# Patient Record
Sex: MALE | Race: WHITE | NOT HISPANIC OR LATINO | Employment: UNEMPLOYED | RURAL
[De-identification: names, ages, dates, MRNs, and addresses within clinical notes are randomized per-mention and may not be internally consistent; named-entity substitution may affect disease eponyms.]

---

## 2024-08-27 ENCOUNTER — HOSPITAL ENCOUNTER (OUTPATIENT)
Facility: HOSPITAL | Age: 49
Discharge: HOME | End: 2024-08-28
Attending: HOSPITALIST | Admitting: HOSPITALIST
Payer: COMMERCIAL

## 2024-08-27 DIAGNOSIS — N17.9 AKI (ACUTE KIDNEY INJURY): ICD-10-CM

## 2024-08-27 DIAGNOSIS — E86.0 DEHYDRATION: Primary | ICD-10-CM

## 2024-08-27 DIAGNOSIS — R07.9 CHEST PAIN: ICD-10-CM

## 2024-08-27 LAB
ALBUMIN SERPL BCP-MCNC: 4.9 G/DL (ref 3.5–5)
ALBUMIN/GLOB SERPL: 1.1 {RATIO}
ALP SERPL-CCNC: 114 U/L (ref 45–115)
ALT SERPL W P-5'-P-CCNC: 30 U/L (ref 16–61)
ANION GAP SERPL CALCULATED.3IONS-SCNC: 19 MMOL/L (ref 7–16)
ANION GAP SERPL CALCULATED.3IONS-SCNC: 24 MMOL/L (ref 7–16)
ANION GAP SERPL CALCULATED.3IONS-SCNC: 30 MMOL/L (ref 7–16)
AST SERPL W P-5'-P-CCNC: 34 U/L (ref 15–37)
BASOPHILS # BLD AUTO: 0.04 K/UL (ref 0–0.2)
BASOPHILS NFR BLD AUTO: 0.3 % (ref 0–1)
BILIRUB SERPL-MCNC: 1.1 MG/DL (ref ?–1.2)
BUN SERPL-MCNC: 31 MG/DL (ref 7–18)
BUN SERPL-MCNC: 32 MG/DL (ref 7–18)
BUN/CREAT SERPL: 5 (ref 6–20)
BUN/CREAT SERPL: 5 (ref 6–20)
CALCIUM SERPL-MCNC: 10.1 MG/DL (ref 8.5–10.1)
CALCIUM SERPL-MCNC: 8.9 MG/DL (ref 8.5–10.1)
CHLORIDE SERPL-SCNC: 92 MMOL/L (ref 98–107)
CHLORIDE SERPL-SCNC: 93 MMOL/L (ref 98–107)
CHLORIDE SERPL-SCNC: 96 MMOL/L (ref 98–107)
CK SERPL-CCNC: 417 U/L (ref 39–308)
CO2 SERPL-SCNC: 21 MMOL/L (ref 21–32)
CO2 SERPL-SCNC: 25 MMOL/L (ref 21–32)
CO2 SERPL-SCNC: 27 MMOL/L (ref 21–32)
CREAT SERPL-MCNC: 5.86 MG/DL (ref 0.7–1.3)
CREAT SERPL-MCNC: 6.54 MG/DL (ref 0.7–1.3)
DIFFERENTIAL METHOD BLD: ABNORMAL
EGFR (NO RACE VARIABLE) (RUSH/TITUS): 10 ML/MIN/1.73M2
EGFR (NO RACE VARIABLE) (RUSH/TITUS): 11 ML/MIN/1.73M2
EOSINOPHIL # BLD AUTO: 0.08 K/UL (ref 0–0.5)
EOSINOPHIL NFR BLD AUTO: 0.6 % (ref 1–4)
ERYTHROCYTE [DISTWIDTH] IN BLOOD BY AUTOMATED COUNT: 14.5 % (ref 11.5–14.5)
EST. AVERAGE GLUCOSE BLD GHB EST-MCNC: 117 MG/DL
GLOBULIN SER-MCNC: 4.6 G/DL (ref 2–4)
GLUCOSE SERPL-MCNC: 166 MG/DL (ref 74–106)
GLUCOSE SERPL-MCNC: 70 MG/DL (ref 74–106)
HBA1C MFR BLD HPLC: 5.7 % (ref 4.5–6.6)
HCT VFR BLD AUTO: 51 % (ref 40–54)
HGB BLD-MCNC: 17.1 G/DL (ref 13.5–18)
LYMPHOCYTES # BLD AUTO: 3.35 K/UL (ref 1–4.8)
LYMPHOCYTES NFR BLD AUTO: 24.7 % (ref 27–41)
MAGNESIUM SERPL-MCNC: 2 MG/DL (ref 1.7–2.3)
MCH RBC QN AUTO: 30.3 PG (ref 27–31)
MCHC RBC AUTO-ENTMCNC: 33.5 G/DL (ref 32–36)
MCV RBC AUTO: 90.3 FL (ref 80–96)
MONOCYTES # BLD AUTO: 1.37 K/UL (ref 0–0.8)
MONOCYTES NFR BLD AUTO: 10.1 % (ref 2–6)
MPC BLD CALC-MCNC: 10.2 FL (ref 9.4–12.4)
NEUTROPHILS # BLD AUTO: 8.71 K/UL (ref 1.8–7.7)
NEUTROPHILS NFR BLD AUTO: 64.3 % (ref 53–65)
PLATELET # BLD AUTO: 278 K/UL (ref 150–400)
POTASSIUM SERPL-SCNC: 3.7 MMOL/L (ref 3.5–5.1)
PROT SERPL-MCNC: 9.5 G/DL (ref 6.4–8.2)
RBC # BLD AUTO: 5.65 M/UL (ref 4.6–6.2)
SODIUM SERPL-SCNC: 138 MMOL/L (ref 136–145)
SODIUM SERPL-SCNC: 138 MMOL/L (ref 136–145)
SODIUM SERPL-SCNC: 139 MMOL/L (ref 136–145)
TROPONIN I SERPL DL<=0.01 NG/ML-MCNC: 35.9 PG/ML
TROPONIN I SERPL DL<=0.01 NG/ML-MCNC: 43.7 PG/ML
WBC # BLD AUTO: 13.55 K/UL (ref 4.5–11)

## 2024-08-27 PROCEDURE — 80053 COMPREHEN METABOLIC PANEL: CPT | Performed by: NURSE PRACTITIONER

## 2024-08-27 PROCEDURE — 96361 HYDRATE IV INFUSION ADD-ON: CPT

## 2024-08-27 PROCEDURE — 27000987 HC MATTRESS, MATRIX LOW PROFILE

## 2024-08-27 PROCEDURE — 83735 ASSAY OF MAGNESIUM: CPT | Performed by: NURSE PRACTITIONER

## 2024-08-27 PROCEDURE — 80048 BASIC METABOLIC PNL TOTAL CA: CPT | Mod: XB | Performed by: NURSE PRACTITIONER

## 2024-08-27 PROCEDURE — 83036 HEMOGLOBIN GLYCOSYLATED A1C: CPT | Performed by: NURSE PRACTITIONER

## 2024-08-27 PROCEDURE — 94761 N-INVAS EAR/PLS OXIMETRY MLT: CPT

## 2024-08-27 PROCEDURE — 27000958

## 2024-08-27 PROCEDURE — 93010 ELECTROCARDIOGRAM REPORT: CPT | Mod: ,,, | Performed by: INTERNAL MEDICINE

## 2024-08-27 PROCEDURE — 99285 EMERGENCY DEPT VISIT HI MDM: CPT | Mod: ,,, | Performed by: NURSE PRACTITIONER

## 2024-08-27 PROCEDURE — 25000003 PHARM REV CODE 250: Performed by: NURSE PRACTITIONER

## 2024-08-27 PROCEDURE — 81003 URINALYSIS AUTO W/O SCOPE: CPT | Performed by: NURSE PRACTITIONER

## 2024-08-27 PROCEDURE — 93005 ELECTROCARDIOGRAM TRACING: CPT

## 2024-08-27 PROCEDURE — 84484 ASSAY OF TROPONIN QUANT: CPT | Performed by: NURSE PRACTITIONER

## 2024-08-27 PROCEDURE — G0378 HOSPITAL OBSERVATION PER HR: HCPCS

## 2024-08-27 PROCEDURE — 96360 HYDRATION IV INFUSION INIT: CPT

## 2024-08-27 PROCEDURE — 80051 ELECTROLYTE PANEL: CPT | Performed by: NURSE PRACTITIONER

## 2024-08-27 PROCEDURE — 82550 ASSAY OF CK (CPK): CPT | Performed by: NURSE PRACTITIONER

## 2024-08-27 PROCEDURE — 85025 COMPLETE CBC W/AUTO DIFF WBC: CPT | Performed by: NURSE PRACTITIONER

## 2024-08-27 PROCEDURE — 99285 EMERGENCY DEPT VISIT HI MDM: CPT | Mod: 25

## 2024-08-27 RX ORDER — SODIUM CHLORIDE 9 MG/ML
1000 INJECTION, SOLUTION INTRAVENOUS CONTINUOUS
Status: DISCONTINUED | OUTPATIENT
Start: 2024-08-27 | End: 2024-08-28 | Stop reason: HOSPADM

## 2024-08-27 RX ORDER — ACETAMINOPHEN 325 MG/1
650 TABLET ORAL EVERY 8 HOURS PRN
Status: DISCONTINUED | OUTPATIENT
Start: 2024-08-27 | End: 2024-08-28 | Stop reason: HOSPADM

## 2024-08-27 RX ORDER — TALC
6 POWDER (GRAM) TOPICAL NIGHTLY PRN
Status: DISCONTINUED | OUTPATIENT
Start: 2024-08-27 | End: 2024-08-28 | Stop reason: HOSPADM

## 2024-08-27 RX ORDER — SODIUM CHLORIDE 9 MG/ML
1000 INJECTION, SOLUTION INTRAVENOUS
Status: COMPLETED | OUTPATIENT
Start: 2024-08-27 | End: 2024-08-27

## 2024-08-27 RX ORDER — SODIUM CHLORIDE 9 MG/ML
1000 INJECTION, SOLUTION INTRAVENOUS CONTINUOUS
Status: DISCONTINUED | OUTPATIENT
Start: 2024-08-27 | End: 2024-08-27

## 2024-08-27 RX ORDER — SODIUM CHLORIDE 0.9 % (FLUSH) 0.9 %
10 SYRINGE (ML) INJECTION EVERY 12 HOURS
Status: DISCONTINUED | OUTPATIENT
Start: 2024-08-27 | End: 2024-08-28 | Stop reason: HOSPADM

## 2024-08-27 RX ADMIN — SODIUM CHLORIDE 1000 ML: 9 INJECTION, SOLUTION INTRAVENOUS at 06:08

## 2024-08-27 RX ADMIN — SODIUM CHLORIDE 1000 ML: 9 INJECTION, SOLUTION INTRAVENOUS at 08:08

## 2024-08-27 RX ADMIN — SODIUM CHLORIDE 500 ML: 9 INJECTION, SOLUTION INTRAVENOUS at 06:08

## 2024-08-27 RX ADMIN — SODIUM CHLORIDE 1000 ML: 9 INJECTION, SOLUTION INTRAVENOUS at 10:08

## 2024-08-27 RX ADMIN — SODIUM CHLORIDE 500 ML: 9 INJECTION, SOLUTION INTRAVENOUS at 05:08

## 2024-08-27 NOTE — ED PROVIDER NOTES
"Encounter Date: 8/27/2024       History     Chief Complaint   Patient presents with    Dehydration    Chest Pain     48 yr old WM with no significant PMH to ED with c/o while working outside today started having muscle cramps and had a "pinch" to chest.  States wife gave him an aspirin.  Denies nausea / vomiting. Reports having terrible cramps to legs.  Admits has not drank much water today.    The history is provided by the patient.   Chest Pain  The current episode started 1 to 2 hours ago. The chest pain is improving. The quality of the pain is described as aching. Pertinent negatives for primary symptoms include no fever, no fatigue, no syncope, no shortness of breath, no cough, no wheezing, no palpitations, no abdominal pain, no nausea, no vomiting, no dizziness and no altered mental status. He tried nothing for the symptoms. Risk factors include male gender and smoking/tobacco exposure.     Review of patient's allergies indicates:  No Known Allergies  History reviewed. No pertinent past medical history.  History reviewed. No pertinent surgical history.  No family history on file.  Social History     Tobacco Use    Smoking status: Every Day     Current packs/day: 0.50     Types: Cigarettes    Smokeless tobacco: Never   Substance Use Topics    Alcohol use: Never    Drug use: Never     Review of Systems   Constitutional:  Negative for fatigue and fever.   Respiratory:  Negative for cough, shortness of breath and wheezing.    Cardiovascular:  Positive for chest pain. Negative for palpitations and syncope.   Gastrointestinal:  Negative for abdominal pain, nausea and vomiting.   Genitourinary: Negative.    Musculoskeletal:  Positive for myalgias.   Skin: Negative.    Neurological:  Negative for dizziness.       Physical Exam     Initial Vitals [08/27/24 1733]   BP Pulse Resp Temp SpO2   107/87 (!) 115 20 97.7 °F (36.5 °C) 96 %      MAP       --         Physical Exam    Nursing note and vitals " reviewed.  Constitutional: He appears well-developed and well-nourished.   Neck: Neck supple.   Cardiovascular:  Normal rate and regular rhythm.           Pulmonary/Chest: Breath sounds normal.   Musculoskeletal:      Cervical back: Neck supple.     Neurological: He is alert and oriented to person, place, and time.   Skin: Skin is warm and dry.         Medical Screening Exam   See Full Note    ED Course   Procedures  Labs Reviewed   COMPREHENSIVE METABOLIC PANEL - Abnormal       Result Value    Sodium 138      Potassium 3.7      Chloride 93 (*)     CO2 25      Anion Gap 24 (*)     Glucose 166 (*)     BUN 32 (*)     Creatinine 6.54 (*)     BUN/Creatinine Ratio 5 (*)     Calcium 10.1      Total Protein 9.5 (*)     Albumin 4.9      Globulin 4.6 (*)     A/G Ratio 1.1      Bilirubin, Total 1.1      Alk Phos 114      ALT 30      AST 34      eGFR 10 (*)    CBC WITH DIFFERENTIAL - Abnormal    WBC 13.55 (*)     RBC 5.65      Hemoglobin 17.1      Hematocrit 51.0      MCV 90.3      MCH 30.3      MCHC 33.5      RDW 14.5      Platelet Count 278      MPV 10.2      Neutrophils % 64.3      Lymphocytes % 24.7 (*)     Neutrophils, Abs 8.71 (*)     Lymphocytes, Absolute 3.35      Diff Type Auto      Monocytes % 10.1 (*)     Eosinophils % 0.6 (*)     Basophils % 0.3      Monocytes, Absolute 1.37 (*)     Eosinophils, Absolute 0.08      Basophils, Absolute 0.04     ELECTROLYTE PANEL - Abnormal    Sodium 139      Potassium 3.7      Chloride 92 (*)     CO2 21      Anion Gap 30 (*)    BASIC METABOLIC PANEL - Abnormal    Sodium 138      Potassium 3.7      Chloride 96 (*)     CO2 27      Anion Gap 19 (*)     Glucose 70 (*)     BUN 31 (*)     Creatinine 5.86 (*)     BUN/Creatinine Ratio 5 (*)     Calcium 8.9      eGFR 11 (*)    CK - Abnormal     (*)    TROPONIN I - Normal    Troponin I High Sensitivity 43.7     MAGNESIUM - Normal    Magnesium 2.0     TROPONIN I - Normal    Troponin I High Sensitivity 35.9     CBC W/ AUTO DIFFERENTIAL     Narrative:     The following orders were created for panel order CBC Auto Differential.  Procedure                               Abnormality         Status                     ---------                               -----------         ------                     CBC with Differential[[id number]]       Abnormal            Final result                 Please view results for these tests on the individual orders.   HEMOGLOBIN A1C    Hemoglobin A1C 5.7      Estimated Average Glucose 117     URINALYSIS          Imaging Results              X-Ray Chest AP Portable (Final result)  Result time 08/27/24 18:56:50      Final result by Arthur Awan MD (08/27/24 18:56:50)                   Impression:      No acute process      Electronically signed by: Arthur Awan  Date:    08/27/2024  Time:    18:56               Narrative:    EXAMINATION:  XR CHEST AP PORTABLE    CLINICAL HISTORY:  .  Chest pain, unspecified    COMPARISON:  February 7, 2014    TECHNIQUE:  Chest x-ray AP portable    FINDINGS:  Cardiac silhouette is upper normal.  There is no mediastinal mass.  There is no pulmonary vascular engorgement.    Lungs and pleural spaces are generally clear.    Osseous structures are unchanged                                       Medications   sodium chloride 0.9% flush 10 mL (has no administration in time range)   melatonin tablet 6 mg (has no administration in time range)   acetaminophen tablet 650 mg (has no administration in time range)   0.9%  NaCl infusion (has no administration in time range)   sodium chloride 0.9% bolus 500 mL 500 mL (0 mLs Intravenous Stopped 8/27/24 1827)   sodium chloride 0.9% bolus 500 mL 500 mL (0 mLs Intravenous Stopped 8/27/24 1847)   sodium chloride 0.9% bolus 1,000 mL 1,000 mL (0 mLs Intravenous Stopped 8/27/24 1938)   0.9%  NaCl infusion (1,000 mLs Intravenous New Bag 8/27/24 2033)     Medical Decision Making  48 yr old WM with no significant PMH to ED with c/o while working outside  "today started having muscle cramps and had a "pinch" to chest.  States wife gave him an aspirin.  Denies nausea / vomiting. Reports having terrible cramps to legs.  Admits has not drank much water today.      Amount and/or Complexity of Data Reviewed  Labs: ordered. Decision-making details documented in ED Course.     Details: GFR 10, pt reports years ago that a Dr told him he had bad kidneys but he doesn't know how bad.  Reports also told that he is borderline diabetic.  Hasn't seen a Dr in years.    Radiology: ordered.     Details: No acute findings.  ECG/medicine tests: ordered.     Details: Sinus tach, NO Stemi, no acute ectopy  Discussion of management or test interpretation with external provider(s): Discussed findings and POC with Dr. Messina who agrees with plan to admit and rehydrate and monitor lab.    Risk  Prescription drug management.               ED Course as of 08/27/24 2102   Tue Aug 27, 2024   1804 BUN(!): 32 [CG]   1805 Creatinine(!): 6.54 [CG]   1805 Glucose(!): 166  Discussed findings with patient who reports was told several years ago that he was borderline diabetic and his kidneys were not working as good as they should but pt states he never went back to the doctor.   [CG]   2027 BUN(!): 31 [CG]   2028 Creatinine(!): 5.86  After 2 L NS.  Will discuss treatment plan with on call MD [CG]   2032 Discussed pt status and POC with Dr. Messina who agrees with plan to admit and suggest CPK .   [CG]      ED Course User Index  [CG] Chaparrita Velazquez FNP                           Clinical Impression:   Final diagnoses:  [R07.9] Chest pain  [E86.0] Dehydration (Primary)  [N17.9] ALONSO (acute kidney injury) - with suspected CKD        ED Disposition Condition    Observation Stable                Chaparrita Velazquez FNP  08/27/24 2102    "

## 2024-08-27 NOTE — Clinical Note
Diagnosis: Dehydration [276.51.ICD-9-CM]   Future Attending Provider: NORRIS JACOBS [[id number]]   Special Needs:: No Special Needs [1]

## 2024-08-27 NOTE — ED TRIAGE NOTES
"Pt reports he was working in the yard today, not drinking much water started cramping all over and "had a pinch in the center of my chest that went through to my back. My wife got me a baby aspirin and some power aid." Placed on monitors upon arrival to room. RT paged for EKG.   "

## 2024-08-28 VITALS
TEMPERATURE: 97 F | BODY MASS INDEX: 30.36 KG/M2 | WEIGHT: 205 LBS | SYSTOLIC BLOOD PRESSURE: 115 MMHG | HEART RATE: 60 BPM | DIASTOLIC BLOOD PRESSURE: 67 MMHG | HEIGHT: 69 IN | OXYGEN SATURATION: 97 % | RESPIRATION RATE: 18 BRPM

## 2024-08-28 PROBLEM — E86.0 DEHYDRATION: Status: RESOLVED | Noted: 2024-08-27 | Resolved: 2024-08-28

## 2024-08-28 PROBLEM — N17.9 AKI (ACUTE KIDNEY INJURY): Status: RESOLVED | Noted: 2024-08-27 | Resolved: 2024-08-28

## 2024-08-28 LAB
ANION GAP SERPL CALCULATED.3IONS-SCNC: 12 MMOL/L (ref 7–16)
BACTERIA #/AREA URNS HPF: ABNORMAL /HPF
BILIRUB UR QL STRIP: NEGATIVE
BUN SERPL-MCNC: 34 MG/DL (ref 7–18)
BUN/CREAT SERPL: 9 (ref 6–20)
CALCIUM SERPL-MCNC: 8 MG/DL (ref 8.5–10.1)
CHLORIDE SERPL-SCNC: 101 MMOL/L (ref 98–107)
CK SERPL-CCNC: 393 U/L (ref 39–308)
CLARITY UR: ABNORMAL
CO2 SERPL-SCNC: 31 MMOL/L (ref 21–32)
COLOR UR: ABNORMAL
CREAT SERPL-MCNC: 3.91 MG/DL (ref 0.7–1.3)
EGFR (NO RACE VARIABLE) (RUSH/TITUS): 18 ML/MIN/1.73M2
GLUCOSE SERPL-MCNC: 129 MG/DL (ref 74–106)
GLUCOSE UR STRIP-MCNC: NEGATIVE MG/DL
KETONES UR STRIP-SCNC: NEGATIVE MG/DL
LEUKOCYTE ESTERASE UR QL STRIP: NEGATIVE
NITRITE UR QL STRIP: NEGATIVE
OHS QRS DURATION: 76 MS
OHS QTC CALCULATION: 492 MS
PH UR STRIP: 5 PH UNITS
POTASSIUM SERPL-SCNC: 3.9 MMOL/L (ref 3.5–5.1)
PROT UR QL STRIP: 30
RBC # UR STRIP: ABNORMAL /UL
RBC #/AREA URNS HPF: ABNORMAL /HPF
SODIUM SERPL-SCNC: 140 MMOL/L (ref 136–145)
SP GR UR STRIP: 1.03
SQUAMOUS #/AREA URNS LPF: ABNORMAL /LPF
UROBILINOGEN UR STRIP-ACNC: 0.2 MG/DL
WBC #/AREA URNS HPF: ABNORMAL /HPF

## 2024-08-28 PROCEDURE — 25000003 PHARM REV CODE 250: Performed by: NURSE PRACTITIONER

## 2024-08-28 PROCEDURE — 27000958

## 2024-08-28 PROCEDURE — 82550 ASSAY OF CK (CPK): CPT | Performed by: NURSE PRACTITIONER

## 2024-08-28 PROCEDURE — 80048 BASIC METABOLIC PNL TOTAL CA: CPT | Performed by: NURSE PRACTITIONER

## 2024-08-28 PROCEDURE — 99236 HOSP IP/OBS SAME DATE HI 85: CPT | Mod: ,,, | Performed by: HOSPITALIST

## 2024-08-28 PROCEDURE — G0378 HOSPITAL OBSERVATION PER HR: HCPCS

## 2024-08-28 PROCEDURE — 27000987 HC MATTRESS, MATRIX LOW PROFILE

## 2024-08-28 PROCEDURE — 96361 HYDRATE IV INFUSION ADD-ON: CPT

## 2024-08-28 PROCEDURE — 36415 COLL VENOUS BLD VENIPUNCTURE: CPT | Performed by: NURSE PRACTITIONER

## 2024-08-28 RX ADMIN — SODIUM CHLORIDE 1000 ML: 9 INJECTION, SOLUTION INTRAVENOUS at 07:08

## 2024-08-28 NOTE — HPI
Mr Harris is a 48 yr old WM with uncertain PMH .  When questioned about kidney function pt reports that years ago that a Dr told him his kidneys didn't function as they should and that he had borderline DM. He presents to the ED with cramping to bilateral legs and an episode of chest pain earlier today.  He states he had been working outside in the heat all day and states has not drank any water.  He was found to have increased BUN/creat but normal K and Na.  He was tachycardic @110- 120s.  He improved with IV hydration.  After discussing with Dr. Messina, he is admitted for more IV fluids, telemetry and monitor labs.      Pt is FULL CODE.

## 2024-08-28 NOTE — HOSPITAL COURSE
Better with IVFs.  Has a component of chronic renal failure.  Unknown baseline but numbers better here.  Needs Nephrology referral.  Will set up.

## 2024-08-28 NOTE — DISCHARGE SUMMARY
[facility]  Hospital Medicine  Discharge Summary      Patient Name: Matias Harris  MRN: [id number]  NUHA: [id number]  Patient Class: OP- Observation  Admission Date: 8/27/2024  Hospital Length of Stay: 0 days  Discharge Date and Time:  08/28/2024 12:42 PM  Attending Physician: Hector Ortez DO   Discharging Provider: Hector Ortez DO  Primary Care Provider: Briseyda, Primary Doctor    Primary Care Team: Networked reference to record PCT     HPI:   Mr Harris is a 48 yr old WM with uncertain PMH .  When questioned about kidney function pt reports that years ago that a Dr told him his kidneys didn't function as they should and that he had borderline DM. He presents to the ED with cramping to bilateral legs and an episode of chest pain earlier today.  He states he had been working outside in the heat all day and states has not drank any water.  He was found to have increased BUN/creat but normal K and Na.  He was tachycardic @110- 120s.  He improved with IV hydration.  After discussing with Dr. Messina, he is admitted for more IV fluids, telemetry and monitor labs.      Pt is FULL CODE.    * No surgery found *      Hospital Course:   Better with IVFs.  Has a component of chronic renal failure.  Unknown baseline but numbers better here.  Needs Nephrology referral.  Will set up.       Goals of Care Treatment Preferences:  Code Status: Full Code         Consults:     No new Assessment & Plan notes have been filed under this hospital service since the last note was generated.  Service: Hospital Medicine    Final Active Diagnoses:      Problems Resolved During this Admission:    Diagnosis Date Noted Date Resolved POA    PRINCIPAL PROBLEM:  ALONSO (acute kidney injury) [N17.9] 08/27/2024 08/28/2024 Yes    Dehydration [E86.0] 08/27/2024 08/28/2024 Yes       Discharged Condition: good    Disposition: Home or Self Care    Follow Up:    Patient Instructions:   No discharge procedures on  file.    Significant Diagnostic Studies: Labs: BMP:   Recent Labs   Lab 08/27/24  1736 08/27/24  1801 08/27/24  1926 08/27/24  1946 08/28/24  0512   *  --   --  70* 129*     --  139 138 140   K 3.7  --  3.7 3.7 3.9   CL 93*  --  92* 96* 101   CO2 25  --  21 27 31   BUN 32*  --   --  31* 34*   CREATININE 6.54*  --   --  5.86* 3.91*   CALCIUM 10.1  --   --  8.9 8.0*   MG  --  2.0  --   --   --        Pending Diagnostic Studies:       None           Medications:  Reconciled Home Medications:      Medication List      You have not been prescribed any medications.         Indwelling Lines/Drains at time of discharge:   Lines/Drains/Airways       None                   Time spent on the discharge of patient: 23 minutes         Hector Ortez DO  Department of Hospital Medicine  [facility]

## 2024-08-28 NOTE — PLAN OF CARE
Problem: Adult Inpatient Plan of Care  Goal: Plan of Care Review  8/27/2024 2111 by Matias Atkinson LPN  Outcome: Progressing  8/27/2024 2111 by Matias Atkinson LPN  Outcome: Progressing  Goal: Patient-Specific Goal (Individualized)  8/27/2024 2111 by Matias Atkinson LPN  Outcome: Progressing  8/27/2024 2111 by Matias Atkinson LPN  Outcome: Progressing  Goal: Absence of Hospital-Acquired Illness or Injury  8/27/2024 2111 by Matias Atkinson LPN  Outcome: Progressing  8/27/2024 2111 by Matias Atkinson LPN  Outcome: Progressing  Goal: Optimal Comfort and Wellbeing  8/27/2024 2111 by Matias Atkinson LPN  Outcome: Progressing  8/27/2024 2111 by Matias Atkinson LPN  Outcome: Progressing  Goal: Readiness for Transition of Care  8/27/2024 2111 by Matias Atkinson LPN  Outcome: Progressing  8/27/2024 2111 by Matias Atkinson LPN  Outcome: Progressing     Problem: Acute Kidney Injury/Impairment  Goal: Fluid and Electrolyte Balance  8/27/2024 2111 by Matias Atkinson LPN  Outcome: Progressing  8/27/2024 2111 by Matias Atkinson LPN  Outcome: Progressing  Goal: Improved Oral Intake  8/27/2024 2111 by Matias Atkinson LPN  Outcome: Progressing  8/27/2024 2111 by Matias Atkinson LPN  Outcome: Progressing  Goal: Effective Renal Function  8/27/2024 2111 by Matias Atkinson LPN  Outcome: Progressing  8/27/2024 2111 by Matias Atkinson LPN  Outcome: Progressing     Problem: Fatigue  Goal: Improved Activity Tolerance  Outcome: Progressing

## 2024-08-28 NOTE — PLAN OF CARE
Patient being discharged  Problem: Adult Inpatient Plan of Care  Goal: Plan of Care Review  Outcome: Met  Goal: Patient-Specific Goal (Individualized)  Outcome: Met  Goal: Absence of Hospital-Acquired Illness or Injury  Outcome: Met  Goal: Optimal Comfort and Wellbeing  Outcome: Met  Goal: Readiness for Transition of Care  Outcome: Met     Problem: Acute Kidney Injury/Impairment  Goal: Fluid and Electrolyte Balance  Outcome: Met  Goal: Improved Oral Intake  Outcome: Met  Goal: Effective Renal Function  Outcome: Met     Problem: Fatigue  Goal: Improved Activity Tolerance  Outcome: Met

## 2024-08-28 NOTE — ASSESSMENT & PLAN NOTE
ALONSO is likely due to pre-renal azotemia due to dehydration. Baseline creatinine is unknown. Most recent creatinine and eGFR are listed below.  Recent Labs     08/27/24  1736 08/27/24  1946 08/28/24  0512   CREATININE 6.54* 5.86* 3.91*   EGFRNORACEVR 10* 11* 18*      Plan  - ALONSO is improving  - Avoid nephrotoxins and renally dose meds for GFR listed above  - Monitor urine output, serial BMP, and adjust therapy as needed  - Likely chronic component.  Was told years ago he has some kidney damage but never followed up.  Will refer to [facility] for workup.

## 2024-08-28 NOTE — SUBJECTIVE & OBJECTIVE
History reviewed. No pertinent past medical history.    History reviewed. No pertinent surgical history.    Review of patient's allergies indicates:  No Known Allergies    No current facility-administered medications on file prior to encounter.     No current outpatient medications on file prior to encounter.     Family History    None       Tobacco Use    Smoking status: Every Day     Current packs/day: 0.50     Types: Cigarettes    Smokeless tobacco: Never   Substance and Sexual Activity    Alcohol use: Never    Drug use: Never    Sexual activity: Yes     Review of Systems   Constitutional:  Negative for appetite change, chills and fever.   Respiratory:  Negative for cough, shortness of breath and wheezing.    Cardiovascular:  Negative for chest pain, palpitations and leg swelling.   Gastrointestinal:  Negative for abdominal distention, diarrhea, nausea and vomiting.   Genitourinary:  Negative for dysuria.   Musculoskeletal:  Positive for arthralgias.   Skin:  Negative for rash.   Neurological:  Positive for dizziness and weakness. Negative for seizures and syncope.        Cramps    Psychiatric/Behavioral:  Negative for agitation, behavioral problems and confusion.    All other systems reviewed and are negative.    Objective:     Vital Signs (Most Recent):  Temp: 97.4 °F (36.3 °C) (08/28/24 0710)  Pulse: 60 (08/28/24 0710)  Resp: 18 (08/28/24 0710)  BP: 115/67 (08/28/24 0710)  SpO2: 97 % (08/28/24 0710) Vital Signs (24h Range):  Temp:  [97.4 °F (36.3 °C)-97.7 °F (36.5 °C)] 97.4 °F (36.3 °C)  Pulse:  [] 60  Resp:  [16-20] 18  SpO2:  [93 %-100 %] 97 %  BP: (107-167)/(67-98) 115/67     Weight: 93 kg (205 lb 0.4 oz) (Simultaneous filing. User may not have seen previous data.)  Body mass index is 30.28 kg/m².     Physical Exam  Vitals reviewed.   Constitutional:       General: He is not in acute distress.     Appearance: Normal appearance.   HENT:      Head: Normocephalic and atraumatic.   Eyes:      General: No  scleral icterus.     Extraocular Movements: Extraocular movements intact.      Conjunctiva/sclera: Conjunctivae normal.      Pupils: Pupils are equal, round, and reactive to light.   Cardiovascular:      Rate and Rhythm: Normal rate and regular rhythm.      Heart sounds: No murmur heard.     No friction rub. No gallop.   Pulmonary:      Effort: Pulmonary effort is normal. No respiratory distress.      Breath sounds: Normal breath sounds. No wheezing or rales.   Abdominal:      General: Abdomen is flat. Bowel sounds are normal. There is no distension.      Palpations: Abdomen is soft.      Tenderness: There is no abdominal tenderness. There is no guarding.   Musculoskeletal:         General: No swelling.   Skin:     General: Skin is warm and dry.      Coloration: Skin is not jaundiced.      Findings: No rash.   Neurological:      General: No focal deficit present.      Mental Status: He is alert and oriented to person, place, and time.      Sensory: No sensory deficit.      Motor: No weakness.   Psychiatric:         Mood and Affect: Mood normal.         Thought Content: Thought content normal.         Judgment: Judgment normal.              CRANIAL NERVES     CN III, IV, VI   Pupils are equal, round, and reactive to light.       Significant Labs: All pertinent labs within the past 24 hours have been reviewed.  Recent Lab Results  (Last 5 results in the past 24 hours)        08/28/24  0512   08/27/24  2232   08/27/24  2035   08/27/24  1946   08/27/24  1926        Anion Gap 12       19   30       Appearance, UA   Slightly Cloudy             Bacteria, UA   Few             Bilirubin (UA)   Negative             BUN 34       31         BUN/CREAT RATIO 9       5         Calcium 8.0       8.9         Chloride 101       96   92       CO2 31       27   21       Color, UA   Light Yellow                  417           Creatinine 3.91       5.86         eGFR 18       11         Estimated Avg Glucose               Glucose  129       70         Glucose, UA   Negative             Hemoglobin A1C External               Ketones, UA   Negative             Leukocyte Esterase, UA   Negative             NITRITE UA   Negative             Blood, UA   Moderate             pH, UA   5.0             Potassium 3.9       3.7   3.7       Protein, UA   30             RBC, UA   None Seen             Sodium 140       138   139       Spec Grav UA   1.030             Squam Epithel, UA   Few             Troponin I High Sensitivity               UROBILINOGEN UA   0.2             WBC, UA   0-5                                    Significant Imaging: I have reviewed all pertinent imaging results/findings within the past 24 hours.

## 2024-08-28 NOTE — H&P
[facility] Medicine  History & Physical    Patient Name: Matias Harris  MRN: [id number]  Patient Class: OP- Observation  Admission Date: 8/27/2024  Attending Physician: Hector Ortez DO   Primary Care Provider: Briseyda, Primary Doctor         Patient information was obtained from patient, past medical records, and ER records.     Subjective:     Principal Problem:ALONSO (acute kidney injury)    Chief Complaint:   Chief Complaint   Patient presents with    Dehydration    Chest Pain        HPI: Mr Harris is a 48 yr old WM with uncertain PMH .  When questioned about kidney function pt reports that years ago that a Dr told him his kidneys didn't function as they should and that he had borderline DM. He presents to the ED with cramping to bilateral legs and an episode of chest pain earlier today.  He states he had been working outside in the heat all day and states has not drank any water.  He was found to have increased BUN/creat but normal K and Na.  He was tachycardic @110- 120s.  He improved with IV hydration.  After discussing with Dr. Messina, he is admitted for more IV fluids, telemetry and monitor labs.      Pt is FULL CODE.    History reviewed. No pertinent past medical history.    History reviewed. No pertinent surgical history.    Review of patient's allergies indicates:  No Known Allergies    No current facility-administered medications on file prior to encounter.     No current outpatient medications on file prior to encounter.     Family History    None       Tobacco Use    Smoking status: Every Day     Current packs/day: 0.50     Types: Cigarettes    Smokeless tobacco: Never   Substance and Sexual Activity    Alcohol use: Never    Drug use: Never    Sexual activity: Yes     Review of Systems   Constitutional:  Negative for appetite change, chills and fever.   Respiratory:  Negative for cough, shortness of breath and wheezing.    Cardiovascular:  Negative for chest  pain, palpitations and leg swelling.   Gastrointestinal:  Negative for abdominal distention, diarrhea, nausea and vomiting.   Genitourinary:  Negative for dysuria.   Musculoskeletal:  Positive for arthralgias.   Skin:  Negative for rash.   Neurological:  Positive for dizziness and weakness. Negative for seizures and syncope.        Cramps    Psychiatric/Behavioral:  Negative for agitation, behavioral problems and confusion.    All other systems reviewed and are negative.    Objective:     Vital Signs (Most Recent):  Temp: 97.4 °F (36.3 °C) (08/28/24 0710)  Pulse: 60 (08/28/24 0710)  Resp: 18 (08/28/24 0710)  BP: 115/67 (08/28/24 0710)  SpO2: 97 % (08/28/24 0710) Vital Signs (24h Range):  Temp:  [97.4 °F (36.3 °C)-97.7 °F (36.5 °C)] 97.4 °F (36.3 °C)  Pulse:  [] 60  Resp:  [16-20] 18  SpO2:  [93 %-100 %] 97 %  BP: (107-167)/(67-98) 115/67     Weight: 93 kg (205 lb 0.4 oz) (Simultaneous filing. User may not have seen previous data.)  Body mass index is 30.28 kg/m².     Physical Exam  Vitals reviewed.   Constitutional:       General: He is not in acute distress.     Appearance: Normal appearance.   HENT:      Head: Normocephalic and atraumatic.   Eyes:      General: No scleral icterus.     Extraocular Movements: Extraocular movements intact.      Conjunctiva/sclera: Conjunctivae normal.      Pupils: Pupils are equal, round, and reactive to light.   Cardiovascular:      Rate and Rhythm: Normal rate and regular rhythm.      Heart sounds: No murmur heard.     No friction rub. No gallop.   Pulmonary:      Effort: Pulmonary effort is normal. No respiratory distress.      Breath sounds: Normal breath sounds. No wheezing or rales.   Abdominal:      General: Abdomen is flat. Bowel sounds are normal. There is no distension.      Palpations: Abdomen is soft.      Tenderness: There is no abdominal tenderness. There is no guarding.   Musculoskeletal:         General: No swelling.   Skin:     General: Skin is warm and dry.       Coloration: Skin is not jaundiced.      Findings: No rash.   Neurological:      General: No focal deficit present.      Mental Status: He is alert and oriented to person, place, and time.      Sensory: No sensory deficit.      Motor: No weakness.   Psychiatric:         Mood and Affect: Mood normal.         Thought Content: Thought content normal.         Judgment: Judgment normal.              CRANIAL NERVES     CN III, IV, VI   Pupils are equal, round, and reactive to light.       Significant Labs: All pertinent labs within the past 24 hours have been reviewed.  Recent Lab Results  (Last 5 results in the past 24 hours)        08/28/24  0512   08/27/24  2232   08/27/24  2035   08/27/24  1946   08/27/24  1926        Anion Gap 12       19   30       Appearance, UA   Slightly Cloudy             Bacteria, UA   Few             Bilirubin (UA)   Negative             BUN 34       31         BUN/CREAT RATIO 9       5         Calcium 8.0       8.9         Chloride 101       96   92       CO2 31       27   21       Color, UA   Light Yellow                  417           Creatinine 3.91       5.86         eGFR 18       11         Estimated Avg Glucose               Glucose 129       70         Glucose, UA   Negative             Hemoglobin A1C External               Ketones, UA   Negative             Leukocyte Esterase, UA   Negative             NITRITE UA   Negative             Blood, UA   Moderate             pH, UA   5.0             Potassium 3.9       3.7   3.7       Protein, UA   30             RBC, UA   None Seen             Sodium 140       138   139       Spec Grav UA   1.030             Squam Epithel, UA   Few             Troponin I High Sensitivity               UROBILINOGEN UA   0.2             WBC, UA   0-5                                    Significant Imaging: I have reviewed all pertinent imaging results/findings within the past 24 hours.  Assessment/Plan:     * ALONSO (acute kidney injury)  ALONSO is likely  due to pre-renal azotemia due to dehydration. Baseline creatinine is unknown. Most recent creatinine and eGFR are listed below.  Recent Labs     08/27/24  1736 08/27/24  1946 08/28/24  0512   CREATININE 6.54* 5.86* 3.91*   EGFRNORACEVR 10* 11* 18*      Plan  - ALONSO is improving  - Avoid nephrotoxins and renally dose meds for GFR listed above  - Monitor urine output, serial BMP, and adjust therapy as needed  - Likely chronic component.  Was told years ago he has some kidney damage but never followed up.  Will refer to [facility] for workup.     Dehydration  Better         VTE Risk Mitigation (From admission, onward)           Ordered     IP VTE LOW RISK PATIENT  Once         08/27/24 2102     Place OTIS hose  Until discontinued         08/27/24 2102                       On 08/28/2024, patient should be placed in hospital observation services under my care.             Hector Ortez,   Department of Hospital Medicine  [facility]